# Patient Record
Sex: MALE | Race: WHITE | NOT HISPANIC OR LATINO | Employment: FULL TIME | URBAN - METROPOLITAN AREA
[De-identification: names, ages, dates, MRNs, and addresses within clinical notes are randomized per-mention and may not be internally consistent; named-entity substitution may affect disease eponyms.]

---

## 2017-05-24 ENCOUNTER — OFFICE VISIT (OUTPATIENT)
Dept: OPTOMETRY | Facility: CLINIC | Age: 57
End: 2017-05-24
Payer: COMMERCIAL

## 2017-05-24 DIAGNOSIS — Z46.0 ENCOUNTER FOR FITTING OR ADJUSTMENT OF SPECTACLES OR CONTACT LENSES: Primary | ICD-10-CM

## 2017-05-24 DIAGNOSIS — H53.8 BLURRED VISION, BILATERAL: Primary | ICD-10-CM

## 2017-05-24 DIAGNOSIS — Z13.5 SCREENING FOR OTHER EYE CONDITIONS: ICD-10-CM

## 2017-05-24 DIAGNOSIS — H52.4 MYOPIA WITH PRESBYOPIA, BILATERAL: ICD-10-CM

## 2017-05-24 DIAGNOSIS — H52.13 MYOPIA WITH PRESBYOPIA, BILATERAL: ICD-10-CM

## 2017-05-24 PROCEDURE — 92004 COMPRE OPH EXAM NEW PT 1/>: CPT | Mod: S$GLB,,, | Performed by: OPTOMETRIST

## 2017-05-24 PROCEDURE — 99999 PR PBB SHADOW E&M-NEW PATIENT-LVL II: CPT | Mod: PBBFAC,,, | Performed by: OPTOMETRIST

## 2017-05-24 PROCEDURE — 92310 CONTACT LENS FITTING OU: CPT | Mod: S$GLB,,, | Performed by: OPTOMETRIST

## 2017-05-24 PROCEDURE — 99499 UNLISTED E&M SERVICE: CPT | Mod: S$GLB,,, | Performed by: OPTOMETRIST

## 2017-05-24 PROCEDURE — 92015 DETERMINE REFRACTIVE STATE: CPT | Mod: S$GLB,,, | Performed by: OPTOMETRIST

## 2017-05-24 RX ORDER — LEVOTHYROXINE SODIUM 125 UG/1
125 TABLET ORAL DAILY
Refills: 0 | COMMUNITY
Start: 2017-05-13 | End: 2018-06-07

## 2017-05-24 RX ORDER — TADALAFIL 5 MG/1
5 TABLET ORAL DAILY
COMMUNITY
End: 2018-11-26 | Stop reason: SDUPTHER

## 2017-05-24 RX ORDER — TESTOSTERONE CYPIONATE 100 MG/ML
INJECTION, SOLUTION INTRAMUSCULAR
Refills: 1 | COMMUNITY
Start: 2017-05-18

## 2017-05-24 NOTE — PATIENT INSTRUCTIONS
Good ocular health in both eyes.  Myopia in each eye, with good correctable VA in each eye.  Presbyopia consistent with age.  New spectacle lens Rx issued for use in lieu of SCLs.  Strongly encouraged Mr. Valdivia to get a back up pair of glasses.  Wearing Air Optix Multifocal SCLs.  Good lens fit in both eyes, and wearing CLs well.  Uses OTC reading glasses over CLs as needed.  Showing need for power change at distance in each CL per over-refraction done today.  Defer new CL Rx.  Issued trial Air Optix Multifocal lenses:       Pair #1  OD  -4.50 / Med add                  OS  -4.50 / Med add       Pair #2  OD -4.50 / High Add                  OS  -4/50 /high add    Also, he can try Med add in OD and high Add in OS.  Call/email after several days to report what lens combination he is happiest with, and will issue new CL accordingly.  Repeat general exam, refraction, and contact lens follow up in one year.

## 2017-05-24 NOTE — PROGRESS NOTES
HPI     Contact Lens Follow Up    Additional comments: contact lens follow up with general eye examination.             Comments   Patient in today for contact lens follow-up with general eye examination.    Refer to additional patient encounter notes dated 05/24/2017         Last edited by Alberto Hightower, OD on 5/24/2017  9:05 AM. (History)            Assessment /Plan     For exam results, see Encounter Report.    1. Encounter for fitting or adjustment of spectacles or contact lenses                    Good ocular health in both eyes.  Myopia in each eye, with good correctable VA in each eye.  Presbyopia consistent with age.  New spectacle lens Rx issued for use in lieu of SCLs.  Strongly encouraged Mr. Valdivia to get a back up pair of glasses.  Wearing Air Optix Multifocal SCLs.  Good lens fit in both eyes, and wearing CLs well.  Uses OTC reading glasses over CLs as needed.  Showing need for power change at distance in each CL per over-refraction done today.  Defer new CL Rx.  Issued trial Air Optix Multifocal lenses:       Pair #1  OD  -4.50 / Med add                  OS  -4.50 / Med add       Pair #2  OD -4.50 / High Add                  OS  -4/50 /high add    Also, he can try Med add in OD and high Add in OS.  Call/email after several days to report what lens combination he is happiest with, and will issue new CL accordingly.  Repeat general exam, refraction, and contact lens follow up in one year.

## 2017-05-24 NOTE — PROGRESS NOTES
"HPI     Concerns About Ocular Health    Additional comments: Eye exam and refraction, and contact lens follow-up           Comments   Patient's age: 56 y.o. WM   Occupation: Finance   Approximate date of last eye examination: 2 yrs ago   Name of last eye doctor seen: Unsure   Wears glasses? Yes      If yes, wears  Full-time or part-time?  Part time   Present glasses are: Bifocal, SV Distance, SV Reading?  OTC readers over   CLs - has no glasses for use when without CLs "for many years"  Wears CLs?:  yes           If yes:              Type of CL worn:  Scl's ( Air Optix Multifocal )   8.6 14.2     -4.00 /+2.00 ( med) OU               Wears full-time or part-time:  Full time                Sleeps with contact lenses:  No                             How often replace CLs:  Monthly               Any problem with VA with CLs?  No              Has patient read and signed the contact lens fee information   document? yes  Headaches?  No  Eye pain/discomfort?  No                                                                                     Flashes?  No  Floaters?  No  Diplopia/Double vision?  No  Patient's Ocular History:         Any eye surgeries? No         Any eye injury?  No         Any treatment for eye disease?  No  Family history of eye disease?  Mom was born blind in one eye  Significant patient medical history:         1. Diabetes?  No       If yes, IDDM or NIDDM?   n/a   2. HBP?  No                 ! OTC eyedrops currently using:  No   ! Prescription eye meds currently using:  No   ! Any history of allergy/adverse reaction to any eye meds used   previously?  No    ! Any history of allergy/adverse reaction to eyedrops used during prior   eye exam(s)? No    ! Any history of allergy/adverse reaction to Novacaine or similar meds?   No   ! Any history of allergy/adverse reaction to Epinephrine or similar meds?   No    ! Patient okay with use of anesthetic eyedrops to check eye pressure?    yes        ! Patient " "okay with use of eyedrops to dilate pupils today?  yes   !  Allergies/Medications/Medical History/Family History reviewed today?    yes      PD =   70/66  Desired reading distance =  17.25"                                                                  Last edited by Alberto Hightower, OD on 5/24/2017  9:11 AM. (History)            Assessment /Plan     For exam results, see Encounter Report.    1. Blurred vision, bilateral     2. Myopia with presbyopia, bilateral     3. Screening for other eye conditions                    Good ocular health in both eyes.  Myopia in each eye, with good correctable VA in each eye.  Presbyopia consistent with age.  New spectacle lens Rx issued for use in lieu of SCLs.  Strongly encouraged Mr. Valdivia to get a back up pair of glasses.  Wearing Air Optix Multifocal SCLs.  Good lens fit in both eyes, and wearing CLs well.  Uses OTC reading glasses over CLs as needed.  Showing need for power change at distance in each CL per over-refraction done today.  Defer new CL Rx.  Issued trial Air Optix Multifocal lenses:       Pair #1  OD  -4.50 / Med add                  OS  -4.50 / Med add       Pair #2  OD -4.50 / High Add                  OS  -4/50 /high add    Also, he can try Med add in OD and high Add in OS.  Call/email after several days to report what lens combination he is happiest with, and will issue new CL accordingly.  Repeat general exam, refraction, and contact lens follow up in one year.         "

## 2017-06-10 ENCOUNTER — DOCUMENTATION ONLY (OUTPATIENT)
Dept: OPTOMETRY | Facility: CLINIC | Age: 57
End: 2017-06-10

## 2017-06-10 NOTE — PROGRESS NOTES
Assessment /Plan     For exam results, see Encounter Report.     Refractive error OU.  Wears mulifocal SCLs.            Refer to previous optometry encounter notes dated 05/24/2017.  Received message from Mr. Valdivia stating that he feels VA is better overall with Air Optix Multifocal SCLs with -4.00 / Med add in both eyes, and he requests CL Rx.    Will enter new CL Rx into record dated 05/24/2017 as requested by Mr. Valdivia, and I will mail the Rx to his home address.    Alberto Hightower., OD

## 2018-05-04 ENCOUNTER — HOSPITAL ENCOUNTER (OUTPATIENT)
Dept: RADIOLOGY | Facility: OTHER | Age: 58
Discharge: HOME | End: 2018-05-04
Attending: OTOLARYNGOLOGY
Payer: COMMERCIAL

## 2018-05-04 DIAGNOSIS — R13.10 DYSPHAGIA: ICD-10-CM

## 2018-05-04 PROCEDURE — 92611 MOTION FLUOROSCOPY/SWALLOW: CPT

## 2018-05-04 PROCEDURE — 74230 X-RAY XM SWLNG FUNCJ C+: CPT | Mod: TC

## 2018-05-04 PROCEDURE — 74230 X-RAY XM SWLNG FUNCJ C+: CPT | Mod: 26,,, | Performed by: RADIOLOGY

## 2018-05-04 PROCEDURE — G8997 SWALLOW GOAL STATUS: HCPCS | Mod: CI

## 2018-05-04 PROCEDURE — G8996 SWALLOW CURRENT STATUS: HCPCS | Mod: CI

## 2018-05-04 PROCEDURE — G8998 SWALLOW D/C STATUS: HCPCS | Mod: CI

## 2018-05-04 NOTE — PROCEDURES
Modified Barium Swallow    Patient Name:  Chad Valdivia   MRN:  [id number]      Recommendations:     Recommendations:      General Recommendations:   1. Exercise program to increase tongue base retraction, hyolaryngeal elevation and crico pharyngeal opening during swallow.  ·  Pt given written handout for [facility] exercise program    2. Recommend referral to outpatient speech therapy    Diet recommendations:  Regular, Thin   Aspiration Precautions:  1. Small sips  2. Two swallows per sip of liquid  3. Small bites of food  4. Two-three  swallows per bite of food  5. Head turn to the left or the right during the swallow, when he is experiencing instances/sensation of food sticking in his pharynx. This may increase ability for epiglottis to fully invert by allow more space between tip of epiglottis and posterior pharyngeal wall    General Precautions: Standard,      Referral     Reason for Referral  Patient was referred for a Modified Barium Swallow Study due to instances of coughing with foods and liquids. MBS completed with patient standing upright and a lateral view of the head and neck was taken.    Diagnosis: <principal problem not specified>       History:     No past medical history on file.     Pt is a 57 year old male reporting intermittent instances of coughing on liquids and solids followed by difficulty breathing, sensation of his throat closing up and needing to relax to be able to resume breathing. He reports first onset approx 15 years ago. States that this occurs approx 5-6x/year. He states he believes it is hereditary, as it also happens to his mother.  He reports no hx of trauma, stroke, reflux.     Objective:     Oral Peripheral Examination  ·  Face is symmetrical at rest and during smile. Labial and lingual strength and ROM are WNL. Lingual protrusion is midline. Pt able to lateralize, elevate and retract tongue. Vocal quality is clear. Speech is 100% intelligible    Consistencies Assessed  · Thin 3, 5  and 10 mls sip via cup. Success sips via cup and straw  · Puree 1/2 to 1 tsp amounts  · Solids small pieces of dry solids    Oral Preparation/Oral Phase  · Lip seal is WNL with no anterior spillage of liquids, purees or solids  · Mildly dcr ability to form a cohesive bolus and achieve a-p transport with purees and solids  · Minimal to mildly dcr a-p transport of purees and solids with need for multple oral swallows to reduce mild amounts of residuals.      Pharyngeal Phase : Mild protrusion of the  Posterior pharyngeal wall into the pharynx noted at the c3-4 level, due to  possible cervical osteophyte at same level    THIN LIQUIDS (3, 5 and 10 mls via cup, unmeasured sips via cup and straw): delayed trigger of the swallow reflex, 1 sec longer than normal, with premature to the valleculae. PENETRATION of the airway during the swallow to the level of the laryngeal vestibule 1/11 swallows. NO aspiration and instance of airway penetration was on large volume successive sip and judged to be WNL. NASAL PENETRATION during the swallow on approx 8/11 swallows during larger volume sips.  MILD to MODERATE residua on the tongue base and in the valleculae after the swallow. Minimal levels on the posterior pharyngeal wall and in the pyriform sinuses.  Decreased and inconsistent epiglottic inversion noted during trials of thin liquids with the tip of the epiglottis catching on the area of the posterior pharyngeal wall that was protruding into the pharyngeal space. This created redirection of the bolus toward the naso pharynx during the swallow and retention of liquids on the vallecular space after the swallow. He was able to clear residuals with effortful swallow and multiple swallows.    PUREES: delayed trigger of the swallow reflex, 2 secs longer than normal, with premature spillage to the valleculae. No airway penetration or aspiration on 5 swallows. Mild RESIDUE after the swallow on the tongue base, in the valleculae and  pyriform sinuses. Pt able to reduce to minimal levels with secondary swallows.    SOLIDS:delayed trigger of the swallow reflex, 2-3 secs longer than normal, with premature spillage to the valleculae and towards the pyriform sinuses. No airway penetration or aspiration on 4 swallows. Minimal to  Mild RESIDUE after the swallow on the tongue base, in the valleculae and pyriform sinuses. Pt able to reduce to minimal levels with secondary swallows. Again:  Decreased and inconsistent epiglottic inversion noted during trials of solids liquids with the tip of the epiglottis catching on the area of the posterior pharyngeal wall that was protruding into the pharyngeal space. This created redirection of the bolus toward the naso pharynx during the swallow and retention of solids on the vallecular space after the swallow. He was able to clear residuals with effortful swallow and multiple swallows.      Cervical Esophageal Phase  · dcr cricopharyngeal opening noted during the swallow with only partial distension and partial duration of opening of the PE segment    Assessment:     Impressions: Mild oral and pharyngeal dysphagia due to delayed trigger of the swallow reflex, dcr laryngeal elevation and dcr pharyngeal contraction. There is  Mild protrusion of the  Posterior pharyngeal wall into the pharynx noted at the c3-4 level, due to  possible cervical osteophyte at same level.  There are instances of  Decreased and inconsistent epiglottic inversion noted during trials of thin liquids and solids with the tip of the epiglottis catching on the area of the posterior pharyngeal wall that was protruding into the pharyngeal space. This created redirection of the bolus toward the naso pharynx during the swallow and retention of liquids and solids on the vallecular space after the swallow. He was able to clear residuals with effortful swallow and multiple swallows. Use of a more weighted bolus also improved epiglottic inversion.              Prognosis: {THERAPY PROGNOSIS:74992}    Barriers:  · {IP SLP MBS BARRIERS [facility]:31505}    Plan  ***    Education  {IP MBS EDUCATION OHS:49490}    Goals:       Plan:   · Patient to be seen:      · Plan of Care expires:     · Plan of Care reviewed with:  patient        Discharge recommendations:  outpatient speech therapy   Barriers to Discharge:  {BARRIERS TO DISCHARGE:818867751}    Time Tracking:   SLP Treatment Date:   05/04/18  Speech Start Time:  0900  Speech Stop Time:  1000     Speech Total Time (min):  60 min    Mirian Hall CCC-SLP  05/04/2018

## 2018-05-28 ENCOUNTER — PATIENT OUTREACH (OUTPATIENT)
Dept: ADMINISTRATIVE | Facility: HOSPITAL | Age: 58
End: 2018-05-28

## 2018-05-28 NOTE — PROGRESS NOTES
[facility] is committed to your overall health.  To help you get the most out of each of your visits, we will review your information to make sure you are up to date on all of your recommended tests and/or procedures.       Your PCP    found that you may be due for:       Health Maintenance Due   Topic Date Due    Hepatitis C Screening  1960    Lipid Panel  1960    TETANUS VACCINE  11/13/1976    Colonoscopy  11/13/2008             If you have had any of the above done at another facility, please bring the records or information with you so that your record at [facility] will be complete.  If you would like to schedule any of these, please contact me.     If you are currently taking medication, please bring it with you to your appointment for review.     Also, if you have any type of Advanced Directives, please bring them with you to your office visit so we may scan them into your chart.       Thank you for Choosing [facility] for your healthcare needs.        Additional Information  If you have questions, you can email [email or web address] or call [phone]  to talk to our [facility] staff. Remember, [facility] is NOT to be used for urgent needs. For medical emergencies, dial 911.

## 2018-06-07 ENCOUNTER — LAB VISIT (OUTPATIENT)
Dept: LAB | Facility: OTHER | Age: 58
End: 2018-06-07
Attending: INTERNAL MEDICINE
Payer: COMMERCIAL

## 2018-06-07 ENCOUNTER — OFFICE VISIT (OUTPATIENT)
Dept: INTERNAL MEDICINE | Facility: CLINIC | Age: 58
End: 2018-06-07
Payer: COMMERCIAL

## 2018-06-07 VITALS
DIASTOLIC BLOOD PRESSURE: 58 MMHG | HEIGHT: 69 IN | SYSTOLIC BLOOD PRESSURE: 100 MMHG | WEIGHT: 175.69 LBS | BODY MASS INDEX: 26.02 KG/M2 | HEART RATE: 54 BPM

## 2018-06-07 DIAGNOSIS — Z12.11 COLON CANCER SCREENING: ICD-10-CM

## 2018-06-07 DIAGNOSIS — Z11.59 NEED FOR HEPATITIS C SCREENING TEST: ICD-10-CM

## 2018-06-07 DIAGNOSIS — E29.1 MALE HYPOGONADISM: ICD-10-CM

## 2018-06-07 DIAGNOSIS — E03.9 ACQUIRED HYPOTHYROIDISM: ICD-10-CM

## 2018-06-07 DIAGNOSIS — Z00.00 WELLNESS EXAMINATION: Primary | ICD-10-CM

## 2018-06-07 LAB — COMPLEXED PSA SERPL-MCNC: 0.8 NG/ML

## 2018-06-07 PROCEDURE — 84153 ASSAY OF PSA TOTAL: CPT

## 2018-06-07 PROCEDURE — 86803 HEPATITIS C AB TEST: CPT

## 2018-06-07 PROCEDURE — 99999 PR PBB SHADOW E&M-EST. PATIENT-LVL III: CPT | Mod: PBBFAC,,, | Performed by: INTERNAL MEDICINE

## 2018-06-07 PROCEDURE — 99386 PREV VISIT NEW AGE 40-64: CPT | Mod: S$GLB,,, | Performed by: INTERNAL MEDICINE

## 2018-06-07 PROCEDURE — 36415 COLL VENOUS BLD VENIPUNCTURE: CPT

## 2018-06-07 RX ORDER — ASPIRIN 81 MG/1
81 TABLET ORAL DAILY
COMMUNITY
End: 2019-02-05

## 2018-06-07 RX ORDER — LIOTHYRONINE SODIUM 5 UG/1
15 TABLET ORAL DAILY
COMMUNITY

## 2018-06-07 RX ORDER — ANASTROZOLE 1 MG/1
1 TABLET ORAL DAILY
COMMUNITY

## 2018-06-07 RX ORDER — LEVOTHYROXINE SODIUM 112 UG/1
112 TABLET ORAL DAILY
COMMUNITY

## 2018-06-07 NOTE — PROGRESS NOTES
Subjective:       Patient ID: Chad Valdivia is a 57 y.o. male.    Chief Complaint: Annual Exam (Est Care)    Pt here for annual and to est care. He is clinically euthyroid on levothyroxine and cytomel. These are rx by a functional med doctor--Dr. Ricketts--that he sees. We reviewed labs that he had in last 6 mos. We did discuss that some of the testing and supplement/med recommendations may not have strong evidence for use and he understood.      He has male hypogonadism and is on testosterone injections. This is also followed by Dr. Ricketts. She has him on arimidex to prevent estrogen effects. We discussed r/b of this approach including use of testosterone.     He is o/w well. Counseled on exercise goals.     Due for c-scope but he declines. However, he agrees to fitkit. R/b of this modality d/w pt.       Review of Systems   Constitutional: Negative for fatigue, fever and unexpected weight change.   HENT: Negative for congestion, rhinorrhea and sore throat.    Eyes: Negative for visual disturbance.   Respiratory: Negative for cough and shortness of breath.    Cardiovascular: Negative for chest pain, palpitations and leg swelling.   Gastrointestinal: Negative for abdominal pain, blood in stool, constipation and diarrhea.   Genitourinary: Negative for difficulty urinating and dysuria.   Skin: Negative for color change and rash.   Neurological: Negative for dizziness and syncope.   Hematological: Negative for adenopathy.   Psychiatric/Behavioral: Negative for dysphoric mood.       Objective:      Physical Exam   Constitutional: He is oriented to person, place, and time. He appears well-developed and well-nourished.   HENT:   Head: Normocephalic and atraumatic.   Right Ear: External ear normal.   Left Ear: External ear normal.   Mouth/Throat: Oropharynx is clear and moist. No oropharyngeal exudate.   Eyes: Conjunctivae and EOM are normal. Pupils are equal, round, and reactive to light.   Neck: Neck supple. Carotid bruit is not  present. No thyromegaly present.   Cardiovascular: Normal rate, regular rhythm, S1 normal, S2 normal, normal heart sounds and intact distal pulses.    Pulmonary/Chest: Effort normal and breath sounds normal.   Abdominal: Soft. Bowel sounds are normal. He exhibits no mass. There is no hepatosplenomegaly. There is no tenderness.   Musculoskeletal: He exhibits no edema.   Lymphadenopathy:     He has no cervical adenopathy.   Neurological: He is alert and oriented to person, place, and time. No cranial nerve deficit.   Psychiatric: He has a normal mood and affect. His behavior is normal.       Assessment:       1. Wellness examination    2. Acquired hypothyroidism    3. Male hypogonadism    4. Need for hepatitis C screening test    5. Colon cancer screening        Plan:       1. Appropriate labs; r/b of psa d/w pt and he wishes to proceed  2. fitkit  3. Pt will continue to see his functional med physician Dr. Ricketts

## 2018-06-08 LAB — HCV AB SERPL QL IA: NEGATIVE

## 2018-06-13 ENCOUNTER — LAB VISIT (OUTPATIENT)
Dept: LAB | Facility: HOSPITAL | Age: 58
End: 2018-06-13
Attending: INTERNAL MEDICINE
Payer: COMMERCIAL

## 2018-06-13 DIAGNOSIS — Z12.11 COLON CANCER SCREENING: ICD-10-CM

## 2018-06-13 LAB — HEMOCCULT STL QL IA: NEGATIVE

## 2018-06-13 PROCEDURE — 82274 ASSAY TEST FOR BLOOD FECAL: CPT

## 2018-11-26 ENCOUNTER — PATIENT MESSAGE (OUTPATIENT)
Dept: INTERNAL MEDICINE | Facility: CLINIC | Age: 58
End: 2018-11-26

## 2018-11-26 RX ORDER — TADALAFIL 5 MG/1
5 TABLET ORAL DAILY
Qty: 30 TABLET | Refills: 6 | Status: SHIPPED | OUTPATIENT
Start: 2018-11-26 | End: 2018-11-28 | Stop reason: SDUPTHER

## 2018-11-28 DIAGNOSIS — N52.9 ERECTILE DYSFUNCTION, UNSPECIFIED ERECTILE DYSFUNCTION TYPE: Primary | ICD-10-CM

## 2018-11-28 NOTE — TELEPHONE ENCOUNTER
Pt sent message via portal please advise.    Good Morning, For this prescription I order through [facility] out of [facility] [place]. All I need is a PDF of the prescription that I can attach to the order I made.  All other prescriptions I fill at [facility]. Thanks for your help.   Best regards,   Chad Castillo LPN

## 2018-11-29 RX ORDER — TADALAFIL 5 MG/1
5 TABLET ORAL DAILY
Qty: 30 TABLET | Refills: 5 | Status: SHIPPED | OUTPATIENT
Start: 2018-11-29 | End: 2018-12-04

## 2018-12-04 RX ORDER — TADALAFIL 5 MG/1
5 TABLET ORAL DAILY
Qty: 30 TABLET | Refills: 6 | Status: SHIPPED | OUTPATIENT
Start: 2018-12-04

## 2018-12-05 ENCOUNTER — PATIENT MESSAGE (OUTPATIENT)
Dept: INTERNAL MEDICINE | Facility: CLINIC | Age: 58
End: 2018-12-05

## 2018-12-06 NOTE — TELEPHONE ENCOUNTER
Pt requesting PDF of prescription Tadalafil 5 mg. So he can send it to a pharmacy of his choice. Please advise.  Audie Castillo LPN

## 2018-12-11 ENCOUNTER — TELEPHONE (OUTPATIENT)
Dept: INTERNAL MEDICINE | Facility: CLINIC | Age: 58
End: 2018-12-11

## 2018-12-11 ENCOUNTER — PATIENT MESSAGE (OUTPATIENT)
Dept: INTERNAL MEDICINE | Facility: CLINIC | Age: 58
End: 2018-12-11

## 2018-12-11 RX ORDER — CLOBETASOL PROPIONATE 0.5 MG/G
OINTMENT TOPICAL 2 TIMES DAILY
COMMUNITY
End: 2019-02-05

## 2019-02-05 ENCOUNTER — OFFICE VISIT (OUTPATIENT)
Dept: OPTOMETRY | Facility: CLINIC | Age: 59
End: 2019-02-05
Payer: COMMERCIAL

## 2019-02-05 ENCOUNTER — OFFICE VISIT (OUTPATIENT)
Dept: OPTOMETRY | Facility: CLINIC | Age: 59
End: 2019-02-05

## 2019-02-05 DIAGNOSIS — H52.13 MYOPIA WITH PRESBYOPIA, BILATERAL: ICD-10-CM

## 2019-02-05 DIAGNOSIS — Z13.5 SCREENING FOR EYE CONDITION: ICD-10-CM

## 2019-02-05 DIAGNOSIS — Z46.0 ENCOUNTER FOR FITTING OR ADJUSTMENT OF SPECTACLES OR CONTACT LENSES: Primary | ICD-10-CM

## 2019-02-05 DIAGNOSIS — H25.13 NUCLEAR SCLEROSIS OF BOTH EYES: Primary | ICD-10-CM

## 2019-02-05 DIAGNOSIS — H52.4 MYOPIA WITH PRESBYOPIA, BILATERAL: ICD-10-CM

## 2019-02-05 PROCEDURE — 92015 DETERMINE REFRACTIVE STATE: CPT | Mod: S$GLB,,, | Performed by: OPTOMETRIST

## 2019-02-05 PROCEDURE — 92015 PR REFRACTION: ICD-10-PCS | Mod: S$GLB,,, | Performed by: OPTOMETRIST

## 2019-02-05 PROCEDURE — 92014 COMPRE OPH EXAM EST PT 1/>: CPT | Mod: S$GLB,,, | Performed by: OPTOMETRIST

## 2019-02-05 PROCEDURE — 99499 UNLISTED E&M SERVICE: CPT | Mod: S$GLB,,, | Performed by: OPTOMETRIST

## 2019-02-05 PROCEDURE — 92014 PR EYE EXAM, EST PATIENT,COMPREHESV: ICD-10-PCS | Mod: S$GLB,,, | Performed by: OPTOMETRIST

## 2019-02-05 PROCEDURE — 92310 PR CONTACT LENS FITTING (NO CHANGE): ICD-10-PCS | Mod: S$GLB,,, | Performed by: OPTOMETRIST

## 2019-02-05 PROCEDURE — 99999 PR PBB SHADOW E&M-EST. PATIENT-LVL III: ICD-10-PCS | Mod: PBBFAC,,, | Performed by: OPTOMETRIST

## 2019-02-05 PROCEDURE — 99999 PR PBB SHADOW E&M-EST. PATIENT-LVL III: CPT | Mod: PBBFAC,,, | Performed by: OPTOMETRIST

## 2019-02-05 PROCEDURE — 99499 NO LOS: ICD-10-PCS | Mod: S$GLB,,, | Performed by: OPTOMETRIST

## 2019-02-05 PROCEDURE — 92310 CONTACT LENS FITTING OU: CPT | Mod: S$GLB,,, | Performed by: OPTOMETRIST

## 2019-02-05 NOTE — PATIENT INSTRUCTIONS
Good ocular health in both eyes.  Trace nuclear sclerosis of lens of both eyes.  Myopia in each eye, with good correctable VA in each eye.  Presbyopia consistent with age.  New spectacle lens Rx issued for use in lieu of SCLs.      Wearing Air Optix Multifocal SCLs.  Good lens fit in both eyes, and wearing CLs well.  Uses OTC reading glasses over CLs as needed.  Showing need for minor power change at distance in the left CL per over-refraction done today.  Per discussion, Mr. Valdivia prefers to keep lens power equal for convenience.  New (duplicate) CL Rx issued.    Repeat general exam, refraction, and contact lens follow up in one year.

## 2019-02-05 NOTE — PROGRESS NOTES
HPI     Contact Lens Follow Up      Additional comments: Patient in today for contact lens follow-up with   general eye examination.  Refer to additional patient encounter notes   dated 02/05/2019..                Comments     Patient in today for contact lens follow-up with general eye examination.    Refer to additional patient encounter notes dated 02/05/2019.            Last edited by Alberto Hightower, OD on 2/5/2019  1:53 PM. (History)            Assessment /Plan     For exam results, see Encounter Report.    1. Encounter for fitting or adjustment of spectacles or contact lenses                  Good ocular health in both eyes.  Trace nuclear sclerosis of lens of both eyes.  Myopia in each eye, with good correctable VA in each eye.  Presbyopia consistent with age.  New spectacle lens Rx issued for use in lieu of SCLs.      Wearing Air Optix Multifocal SCLs.  Good lens fit in both eyes, and wearing CLs well.  Uses OTC reading glasses over CLs as needed.  Showing need for minor power change at distance in the left CL per over-refraction done today.  Per discussion, Mr. Valdivia prefers to keep lens power equal for convenience.  New (duplicate) CL Rx issued.    Repeat general exam, refraction, and contact lens follow up in one year.

## 2019-02-05 NOTE — PROGRESS NOTES
HPI     Patient's age: 58 y.o. WM  Occupation: executive   Approximate date of last eye examination:  05/2017  Name of last eye doctor seen: Dr. Hightower  City/State: [place], [place]  Wears glasses? yes     If yes, wears  Full-time or part-time?  Part time  Present glasses are: Bifocal, SV Distance, SV Reading?  Single vision   reading Rx over CLs.   Approximate age of present glasses:  5 yrs   Got new glasses following last exam, or subsequently?:  No; prior to that   Any problem with VA with glasses?  no  Wears CLs?:  yes           If yes:              Type of CL worn: Single vision Distance               Wears full-time or part-time:  Full time                Sleeps with contact lenses:  no               CL Solution used:  Unknown               How often replace CLs:  Monthly                Any problem with VA with CLs?  No; vision and comfort is good              Has patient read and signed the contact lens fee information   document? Pt believes he has   Headaches?  No; rarely get them  Eye pain/discomfort?  no                                                                                     Flashes?  no  Floaters?  Sees stars when standing up really quickly once every 3 months   - longstanding; last occurrence was 3-4 months ago;  Also, when in bright lights pt would see a bright halo along the sides of   his vision (unknown eye) 3-4 months ago x 3 over the course of 2 weeks -   has not returned sinces  Diplopia/Double vision?  no  Patient's Ocular History:         Any eye surgeries? no         Any eye injury?  no         Any treatment for eye disease?  no  Family history of eye disease?  no  Significant patient medical history:         1. Diabetes?  no   2. HBP?  no              3. Other (describe):  No   ! OTC eyedrops currently using:  no   ! Prescription eye meds currently using:  no   ! Any history of allergy/adverse reaction to any eye meds used   previously?  no    ! Any history of allergy/adverse  reaction to eyedrops used during prior   eye exam(s)? no    ! Any history of allergy/adverse reaction to Novacaine or similar meds?   no   ! Any history of allergy/adverse reaction to Epinephrine or similar meds?   no    ! Patient okay with use of anesthetic eyedrops to check eye pressure?  no          ! Patient okay with use of eyedrops to dilate pupils today?  no   !  Allergies/Medications/Medical History/Family History reviewed today?    Reviewed       PD =     Desired reading distance =    Approx computer distance =                                                                 Last edited by Alberto Hightower, OD on 2/5/2019  1:32 PM. (History)            Assessment /Plan     For exam results, see Encounter Report.    1. Nuclear sclerosis of both eyes     2. Myopia with presbyopia, bilateral     3. Screening for eye condition                  Good ocular health in both eyes.  Trace nuclear sclerosis of lens of both eyes.  Myopia in each eye, with good correctable VA in each eye.  Presbyopia consistent with age.  New spectacle lens Rx issued for use in lieu of SCLs.      Wearing Air Optix Multifocal SCLs.  Good lens fit in both eyes, and wearing CLs well.  Uses OTC reading glasses over CLs as needed.  Showing need for minor power change at distance in the left CL per over-refraction done today.  Per discussion, Mr. Valdivia prefers to keep lens power equal for convenience.  New (duplicate) CL Rx issued.    Repeat general exam, refraction, and contact lens follow up in one year.

## 2019-06-21 DIAGNOSIS — Z12.11 COLON CANCER SCREENING: ICD-10-CM

## 2021-01-05 ENCOUNTER — PATIENT MESSAGE (OUTPATIENT)
Dept: ADMINISTRATIVE | Facility: HOSPITAL | Age: 61
End: 2021-01-05

## 2021-04-05 ENCOUNTER — PATIENT MESSAGE (OUTPATIENT)
Dept: ADMINISTRATIVE | Facility: HOSPITAL | Age: 61
End: 2021-04-05

## 2021-04-26 ENCOUNTER — PATIENT MESSAGE (OUTPATIENT)
Dept: RESEARCH | Facility: HOSPITAL | Age: 61
End: 2021-04-26